# Patient Record
Sex: MALE | Race: WHITE | NOT HISPANIC OR LATINO | URBAN - METROPOLITAN AREA
[De-identification: names, ages, dates, MRNs, and addresses within clinical notes are randomized per-mention and may not be internally consistent; named-entity substitution may affect disease eponyms.]

---

## 2024-10-02 ENCOUNTER — ANCILLARY PROCEDURE (OUTPATIENT)
Dept: URGENT CARE | Age: 34
End: 2024-10-02

## 2024-10-02 ENCOUNTER — OFFICE VISIT (OUTPATIENT)
Dept: URGENT CARE | Age: 34
End: 2024-10-02

## 2024-10-02 VITALS
OXYGEN SATURATION: 96 % | RESPIRATION RATE: 17 BRPM | DIASTOLIC BLOOD PRESSURE: 87 MMHG | WEIGHT: 181 LBS | SYSTOLIC BLOOD PRESSURE: 142 MMHG | TEMPERATURE: 96.9 F | BODY MASS INDEX: 23.88 KG/M2 | HEART RATE: 84 BPM

## 2024-10-02 DIAGNOSIS — R07.81 RIB PAIN ON LEFT SIDE: ICD-10-CM

## 2024-10-02 DIAGNOSIS — R03.0 ELEVATED BLOOD-PRESSURE READING WITHOUT DIAGNOSIS OF HYPERTENSION: ICD-10-CM

## 2024-10-02 DIAGNOSIS — R30.0 DYSURIA: Primary | ICD-10-CM

## 2024-10-02 DIAGNOSIS — S29.012A STRAIN OF LATISSIMUS DORSI MUSCLE, INITIAL ENCOUNTER: ICD-10-CM

## 2024-10-02 LAB
POC APPEARANCE, URINE: CLEAR
POC BILIRUBIN, URINE: NEGATIVE
POC BLOOD, URINE: NEGATIVE
POC COLOR, URINE: NORMAL
POC GLUCOSE, URINE: NEGATIVE MG/DL
POC KETONES, URINE: NEGATIVE MG/DL
POC LEUKOCYTES, URINE: NEGATIVE
POC NITRITE,URINE: NEGATIVE
POC PH, URINE: 6 PH
POC PROTEIN, URINE: NEGATIVE MG/DL
POC SPECIFIC GRAVITY, URINE: 1.01
POC UROBILINOGEN, URINE: 0.2 EU/DL

## 2024-10-02 PROCEDURE — 87491 CHLMYD TRACH DNA AMP PROBE: CPT

## 2024-10-02 PROCEDURE — 87086 URINE CULTURE/COLONY COUNT: CPT

## 2024-10-02 PROCEDURE — 87661 TRICHOMONAS VAGINALIS AMPLIF: CPT

## 2024-10-02 PROCEDURE — 87591 N.GONORRHOEAE DNA AMP PROB: CPT

## 2024-10-02 RX ORDER — ORPHENADRINE CITRATE 100 MG/1
100 TABLET, EXTENDED RELEASE ORAL NIGHTLY PRN
Qty: 10 TABLET | Refills: 0 | Status: SHIPPED | OUTPATIENT
Start: 2024-10-02

## 2024-10-02 NOTE — PROGRESS NOTES
"Subjective   Patient ID: Cas Burns is a 34 y.o. male. They present today with a chief complaint of Other (X 2 months, pain on tip of the urethra, extreme left side rib pain, pt suspects kidney infection, or kidney stones. Pt states he is not sexually active and knows is not an STD.).    History of Present Illness  Cas is a 34-year-old male with no known medical history and no established primary care provider who presents to the urgent care for 2 years of left-sided rib pain as well as 2 months of burning with urination \"urethral pain\".  Patient denies penile discharge, lesions or known history of STIs.  Patient states he has had kidney stones in the past.  Patient denies trauma or injury.  Patient denies fever, shortness of breath or difficulty breathing.  Patient denies any scrotal swelling or rash.  No other symptoms or concerns otherwise reported.    Past Medical History  Allergies as of 10/02/2024    (No Known Allergies)       (Not in a hospital admission)       No past medical history on file.    No past surgical history on file.     reports that he has never smoked. He has never used smokeless tobacco.    Review of Systems  A 10-point review of systems was performed, otherwise unremarkable unless stated in the history of present illness.                Objective    Vitals:    10/02/24 1030   BP: 142/87   Pulse: 84   Resp: 17   Temp: 36.1 °C (96.9 °F)   SpO2: 96%   Weight: 82.1 kg (181 lb)     No LMP for male patient.    Gen: Vitals noted and reviewed, no evidence of acute distress, well developed and afebrile.   Psych: Mood and affect appropriate for setting.  Head/Face: Atraumatic and normocephalic.   Neuro: No focal deficits noted.  Chest: +TTP over lateral left upper ribs and intercostals. No bony deformity or bony crepitus noted. Symmetric chest rise and thorax. No ecchymosis or bony deformity   Cardiac: Regular rate and rhythm no murmur.   Lungs: Clear to auscultation throughout, No evidence " of wheezing, rhonchi or crackles. Good aeration throughout.   Abdomen: Soft non-tender throughout. Normoactive bowel sounds. No evidence of masses. No CVA tenderness    Extremities: Symmetrical, No peripheral edema  Skin: Without evidence of ecchymosis, wounds, or rashes.      Point of Care Test & Imaging Results from this visit  Results for orders placed or performed in visit on 10/02/24   POCT UA Automated manually resulted   Result Value Ref Range    POC Color, Urine Light-Yellow Straw, Yellow, Light-Yellow    POC Appearance, Urine Clear Clear    POC Glucose, Urine NEGATIVE NEGATIVE mg/dl    POC Bilirubin, Urine NEGATIVE NEGATIVE    POC Ketones, Urine NEGATIVE NEGATIVE mg/dl    POC Specific Gravity, Urine 1.010 1.005 - 1.035    POC Blood, Urine NEGATIVE NEGATIVE    POC PH, Urine 6.0 No Reference Range Established PH    POC Protein, Urine NEGATIVE NEGATIVE, 30 (1+) mg/dl    POC Urobilinogen, Urine 0.2 0.2, 1.0 EU/DL    Poc Nitrite, Urine NEGATIVE NEGATIVE    POC Leukocytes, Urine NEGATIVE NEGATIVE      XR ribs left 2 views    Result Date: 10/2/2024  Interpreted By:  Mattie Decker, STUDY: XR RIBS LEFT 2 VIEWS;  10/2/2024 11:07 am   INDICATION: Signs/Symptoms:Chronic rib pain, no known injury.   COMPARISON: None.   ACCESSION NUMBER(S): [id number]   ORDERING CLINICIAN: CHUY MCELROY   FINDINGS: CARDIOMEDIASTINAL SILHOUETTE: Cardiomediastinal silhouette is normal in size and configuration.     LUNGS: Lungs are clear.   ABDOMEN: No remarkable upper abdominal findings.     BONES: 6 views left ribs: There is no left rib fracture. There is no blastic or lytic lesion.       Negative left ribs.   MACRO: None   Signed by: Mattie Decker 10/2/2024 11:38 AM Dictation workstation:   [id number]     Diagnostic study results (if any) were reviewed by Chuy Mcelroy DO.    Assessment/Plan   Allergies, medications, history, and pertinent labs/EKGs/Imaging reviewed by Chuy Mcelroy DO.     Medical Decision Making  Discussed with  the patient history of chronic dysuria may be secondary to STI versus renal calculus versus other unspecified etiology that warrants further evaluation definitive management by primary care provider and additional testing.  We reviewed UA in office and will follow-up on urine culture and STI cultures and adjust treatment accordingly.  Regarding the patient's chronic atraumatic left-sided rib discomfort/pain we advise this may be secondary to a chronic recurrent muscle strain versus other unspecified etiologies and reviewed radiographic imaging without signs of acute fractures or lytic or blastic lesions.  We advised further evaluation definitive management with follow-up with a primary care provider for additional testing is warranted.  We provided referral to primary care provider in office today to address above-mentioned issues as well as elevated blood pressure without diagnosis of hypertension. Follow up with PCP. We advised seeking immediate emergency medical attention if symptoms fail to improve, worsen or any concerning symptoms arise. Patient voiced full understanding and agreement to plan.      Orders and Diagnoses  Diagnoses and all orders for this visit:  Dysuria  -     POCT UA Automated manually resulted  -     Urine Culture  -     C. trachomatis / N. gonorrhoeae, Amplified  -     Trichomonas vaginalis, Amplified  -     Referral to Primary Care; Future  Rib pain on left side  -     XR ribs left 2 views; Future  -     Referral to Primary Care; Future  -     orphenadrine (Norflex) 100 mg 12 hr tablet; Take 1 tablet (100 mg) by mouth as needed at bedtime for muscle spasms. Do not crush, chew, or split.  Elevated blood-pressure reading without diagnosis of hypertension  Strain of latissimus dorsi muscle, initial encounter  -     orphenadrine (Norflex) 100 mg 12 hr tablet; Take 1 tablet (100 mg) by mouth as needed at bedtime for muscle spasms. Do not crush, chew, or split.      Medical Admin  Record      Patient disposition: Home    Electronically signed by Chuy Fritz DO  12:56 PM

## 2024-10-03 LAB
BACTERIA UR CULT: NORMAL
C TRACH RRNA SPEC QL NAA+PROBE: NEGATIVE
N GONORRHOEA DNA SPEC QL PROBE+SIG AMP: NEGATIVE
T VAGINALIS RRNA SPEC QL NAA+PROBE: NEGATIVE

## 2025-05-21 ENCOUNTER — APPOINTMENT (OUTPATIENT)
Age: 35
End: 2025-05-21

## 2025-06-23 NOTE — PROGRESS NOTES
Subjective   Patient ID: Cas Burns is a 35 y.o. male    HPI  35 y.o. male who presents [facility] [place] for dysuria. Patient reports burning with urination. He had an STI profile with negative results. He notes taking doxycycline that did improve his symptoms, however he notes the burning returned after 2 weeks. He denies any unprotected intercourse and reports 1 sexual partner. Denies any penile discharge. Denies any ejaculation and erectile dysfunctions. Good urinary flow. Normal physical exam today.    He also reports left flank pain under ribs. He notes consistent pain x2 years. He notes a 23 lbs weight gain since 11/2024 despite exercising.     The most recent Urine Culture, conducted on 10/02/2024, revealed:  Normal genitourinary azul       Review of Systems    All systems were reviewed. Anything negative was noted in the HPI.    Objective   Physical Exam  Genitourinary:     Pubic Area: No rash.       Penis: Normal and circumcised. No hypospadias.       Testes:         Right: Mass, tenderness, swelling, testicular hydrocele or varicocele not present. Right testis is descended.         Left: Mass, tenderness, swelling, testicular hydrocele or varicocele not present. Left testis is descended.      Epididymis:      Right: Not inflamed or enlarged. No mass or tenderness.      Left: Not inflamed or enlarged. No mass or tenderness.         General: Well developed, well nourished, alert and cooperative, appears in no acute distress   Eyes: Non-injected conjunctiva, sclera clear, no proptosis   Cardiac: Extremities are warm and well perfused. No edema, cyanosis or pallor   Lungs: Breathing is easy, non-labored. Speaking in clear and complete sentences. Normal diaphragmatic movement   MSK: Ambulatory with steady gait, unassisted   Neuro: Alert and oriented to person, place, and time   Psych: Demonstrates good judgment and reason, without hallucinations, abnormal affect or abnormal behaviors   Skin: No obvious  lesions, no rashes       No CVA tenderness bilaterally   No suprapubic pain or discomfort       Medical History[1]      Surgical History[2]      Assessment/Plan   Prostatitis, Dysuria, Left Flank Pain    35 y.o. male who presents for the above condition, We discussed empiric treatment with an anti-inflammatory in the form of flurbiprofen 100mg twice daily and an antibiotic in the form of ciprofloxacin 500mg p.o. twice daily for 4 days each. We discussed the risk, benefits, adverse events, side effects of the medications, he verbalized understanding and would like to proceed. We also discussed lifestyle modifications in the form of scrotal elevation, Jacuzzi and frequent ejaculation.     - Advised pt to sit in Jacuzzi or hot both at least 4 times a week.  - Supportive care: Advise the patient to wear a jock strap or tight underwear for the next six weeks to support the testicle and reduce inflammation.  - Ejaculation: Recommend frequent ejaculation (at least three times a week) to help reduce inflammation.  - Medication: Advise the patient to take Advil or ibuprofen, two tablets in the morning and two at night for three to four days, with food.      Plan:  - Referred to [facility]  - Follow-up up with:      - TSH      - Renal US      - Testosterone free and total (advised pt to complete work-up early in the morning 1 week before next appointment)  - Prescribed flurbiprofen 100mg p.o. twice daily for 4 days      E&M visit today is associated with current or anticipated ongoing medical care services related to a patient's single, serious condition or a complex condition.     6/24/2025    Mirella Attestation  By signing my name below, I, Mirella Brito attest that this documentation has been prepared under the direction and in the presence of Dr. Bhavin Dunbar.          [1] No past medical history on file.  [2] No past surgical history on file.

## 2025-06-24 ENCOUNTER — APPOINTMENT (OUTPATIENT)
Dept: UROLOGY | Facility: HOSPITAL | Age: 35
End: 2025-06-24

## 2025-06-24 DIAGNOSIS — N41.9 PROSTATITIS, UNSPECIFIED PROSTATITIS TYPE: Primary | ICD-10-CM

## 2025-06-24 PROCEDURE — 99214 OFFICE O/P EST MOD 30 MIN: CPT | Performed by: STUDENT IN AN ORGANIZED HEALTH CARE EDUCATION/TRAINING PROGRAM

## 2025-06-24 PROCEDURE — 99204 OFFICE O/P NEW MOD 45 MIN: CPT | Performed by: STUDENT IN AN ORGANIZED HEALTH CARE EDUCATION/TRAINING PROGRAM

## 2025-06-24 RX ORDER — FLURBIPROFEN 100 MG/1
100 TABLET, FILM COATED ORAL 2 TIMES DAILY
Qty: 8 TABLET | Refills: 0 | Status: SHIPPED | OUTPATIENT
Start: 2025-06-24 | End: 2025-06-28

## 2025-06-30 DIAGNOSIS — N41.9 PROSTATITIS, UNSPECIFIED PROSTATITIS TYPE: Primary | ICD-10-CM

## 2025-06-30 RX ORDER — CIPROFLOXACIN 500 MG/1
500 TABLET, FILM COATED ORAL 2 TIMES DAILY
Qty: 8 TABLET | Refills: 0 | Status: SHIPPED | OUTPATIENT
Start: 2025-06-30 | End: 2025-07-04

## 2025-07-02 ENCOUNTER — HOSPITAL ENCOUNTER (OUTPATIENT)
Dept: RADIOLOGY | Facility: CLINIC | Age: 35
End: 2025-07-02

## 2025-07-10 ENCOUNTER — HOSPITAL ENCOUNTER (OUTPATIENT)
Dept: RADIOLOGY | Facility: CLINIC | Age: 35
Discharge: HOME | End: 2025-07-10

## 2025-07-10 DIAGNOSIS — N41.9 PROSTATITIS, UNSPECIFIED PROSTATITIS TYPE: ICD-10-CM

## 2025-07-10 PROCEDURE — 76770 US EXAM ABDO BACK WALL COMP: CPT | Performed by: STUDENT IN AN ORGANIZED HEALTH CARE EDUCATION/TRAINING PROGRAM

## 2025-07-10 PROCEDURE — 76770 US EXAM ABDO BACK WALL COMP: CPT

## 2025-07-13 LAB
TESTOST FREE SERPL-MCNC: 50.3 PG/ML (ref 35–155)
TESTOST SERPL-MCNC: 352 NG/DL (ref 250–1100)
TSH SERPL-ACNC: 1.97 MIU/L (ref 0.4–4.5)

## 2025-07-21 NOTE — PROGRESS NOTES
Subjective   Patient ID: Cas Burns is a 35 y.o. male    HPI  35 y.o. male who presents for a follow-up visit with dysuria. Patient reports burning with urination. He had an STI profile with negative results. He notes taking doxycycline that did improve his symptoms, however he notes the burning returned after 2 weeks. He denies any unprotected intercourse and reports 1 sexual partner. Denies any penile discharge. Denies any ejaculation and erectile dysfunctions. Good urinary flow. Normal physical exam today.    He also reports left flank pain under ribs. He notes consistent pain x2 years. He notes a 23 lbs weight gain since 11/2024 despite exercising.     Today, he reports his symptoms have resolved since his last visit. Denies any urinary symptoms such as urgency, frequency, hematuria, pain, burning, and infections. He denies any ejaculation and erectile dysfunctions.     The most recent Testosterone, free, total, conducted on 07/10/2025, revealed:  TESTOSTERONE, TOTAL, MS         TESTOSTERONE, FREE              352 ng/dL                                  50.3 pg/mL              The most recent Renal US, conducted on 07/10/2025, revealed:  No calculi or hydronephrosis.       Review of Systems    All systems were reviewed. Anything negative was noted in the HPI.    Objective   Physical Exam  Genitourinary:     Pubic Area: No rash.       Penis: Normal and circumcised. No hypospadias.       Testes:         Right: Mass, tenderness, swelling, testicular hydrocele or varicocele not present. Right testis is descended.         Left: Mass, tenderness, swelling, testicular hydrocele or varicocele not present. Left testis is descended.      Epididymis:      Right: Not inflamed or enlarged. No mass or tenderness.      Left: Not inflamed or enlarged. No mass or tenderness.         General: Well developed, well nourished, alert and cooperative, appears in no acute distress   Eyes: Non-injected conjunctiva, sclera clear,  no proptosis   Cardiac: Extremities are warm and well perfused. No edema, cyanosis or pallor   Lungs: Breathing is easy, non-labored. Speaking in clear and complete sentences. Normal diaphragmatic movement   MSK: Ambulatory with steady gait, unassisted   Neuro: Alert and oriented to person, place, and time   Psych: Demonstrates good judgment and reason, without hallucinations, abnormal affect or abnormal behaviors   Skin: No obvious lesions, no rashes       No CVA tenderness bilaterally   No suprapubic pain or discomfort       Medical History[1]      Surgical History[2]      Assessment/Plan   Prostatitis and Dysuria that resolved, Left Flank Pain improved     35 y.o. male who presents for the above condition, We discussed empiric treatment with an anti-inflammatory in the form of flurbiprofen 100mg twice daily and an antibiotic in the form of ciprofloxacin 500mg p.o. twice daily for 4 days each. We discussed the risk, benefits, adverse events, side effects of the medications, he verbalized understanding and would like to proceed. We also discussed lifestyle modifications in the form of scrotal elevation, Jacuzzi and frequent ejaculation.      - Avoid caffeine entirely     - Advised pt to drink at least 60 to 80 ounces of water daily.     Plan:  - Follow-up in 1 year with:      - Testosterone free and total (advised pt to complete work-up early in the morning 1 week before next appointment)      E&M visit today is associated with current or anticipated ongoing medical care services related to a patient's single, serious condition or a complex condition.     7/22/2025    Scribe Attestation  By signing my name below, I, Madisyn Cordoba, Mirella attest that this documentation has been prepared under the direction and in the presence of Dr. Bhavin Dunbar.          [1] No past medical history on file.  [2] No past surgical history on file.

## 2025-07-22 ENCOUNTER — APPOINTMENT (OUTPATIENT)
Dept: UROLOGY | Facility: HOSPITAL | Age: 35
End: 2025-07-22

## 2025-07-22 DIAGNOSIS — R53.83 OTHER FATIGUE: Primary | ICD-10-CM

## 2025-07-22 PROCEDURE — 99214 OFFICE O/P EST MOD 30 MIN: CPT | Performed by: STUDENT IN AN ORGANIZED HEALTH CARE EDUCATION/TRAINING PROGRAM

## 2026-07-22 ENCOUNTER — APPOINTMENT (OUTPATIENT)
Dept: UROLOGY | Facility: HOSPITAL | Age: 36
End: 2026-07-22